# Patient Record
Sex: FEMALE | URBAN - METROPOLITAN AREA
[De-identification: names, ages, dates, MRNs, and addresses within clinical notes are randomized per-mention and may not be internally consistent; named-entity substitution may affect disease eponyms.]

---

## 2017-07-05 ENCOUNTER — HOSPITAL ENCOUNTER (EMERGENCY)
Facility: CLINIC | Age: 27
Discharge: HOME | End: 2017-07-05
Attending: EMERGENCY MEDICINE | Admitting: EMERGENCY MEDICINE
Payer: COMMERCIAL

## 2017-07-05 VITALS
HEART RATE: 82 BPM | DIASTOLIC BLOOD PRESSURE: 49 MMHG | WEIGHT: 143.13 LBS | RESPIRATION RATE: 18 BRPM | SYSTOLIC BLOOD PRESSURE: 96 MMHG | TEMPERATURE: 99.2 F | BODY MASS INDEX: 24.57 KG/M2 | OXYGEN SATURATION: 100 %

## 2017-07-05 DIAGNOSIS — R51.9 HEADACHE, UNSPECIFIED HEADACHE TYPE: ICD-10-CM

## 2017-07-05 LAB
HCG UR QL: NEGATIVE
INTERNAL QC OK POCT: YES

## 2017-07-05 PROCEDURE — 25000128 H RX IP 250 OP 636: Performed by: EMERGENCY MEDICINE

## 2017-07-05 PROCEDURE — 96374 THER/PROPH/DIAG INJ IV PUSH: CPT | Performed by: EMERGENCY MEDICINE

## 2017-07-05 PROCEDURE — 99284 EMERGENCY DEPT VISIT MOD MDM: CPT | Mod: 25 | Performed by: EMERGENCY MEDICINE

## 2017-07-05 PROCEDURE — 96361 HYDRATE IV INFUSION ADD-ON: CPT | Performed by: EMERGENCY MEDICINE

## 2017-07-05 PROCEDURE — 99284 EMERGENCY DEPT VISIT MOD MDM: CPT | Mod: Z6 | Performed by: EMERGENCY MEDICINE

## 2017-07-05 PROCEDURE — 81025 URINE PREGNANCY TEST: CPT | Performed by: EMERGENCY MEDICINE

## 2017-07-05 PROCEDURE — 96375 TX/PRO/DX INJ NEW DRUG ADDON: CPT | Performed by: EMERGENCY MEDICINE

## 2017-07-05 RX ORDER — DIPHENHYDRAMINE HYDROCHLORIDE 50 MG/ML
25 INJECTION INTRAMUSCULAR; INTRAVENOUS ONCE
Status: COMPLETED | OUTPATIENT
Start: 2017-07-05 | End: 2017-07-05

## 2017-07-05 RX ADMIN — SODIUM CHLORIDE 1000 ML: 9 INJECTION, SOLUTION INTRAVENOUS at 22:12

## 2017-07-05 RX ADMIN — DIPHENHYDRAMINE HYDROCHLORIDE 25 MG: 50 INJECTION, SOLUTION INTRAMUSCULAR; INTRAVENOUS at 22:14

## 2017-07-05 RX ADMIN — PROCHLORPERAZINE EDISYLATE 10 MG: 5 INJECTION INTRAMUSCULAR; INTRAVENOUS at 22:12

## 2017-07-05 ASSESSMENT — ENCOUNTER SYMPTOMS
LIGHT-HEADEDNESS: 0
NECK STIFFNESS: 0
FEVER: 0
NECK PAIN: 0
ABDOMINAL PAIN: 0
SHORTNESS OF BREATH: 0
NAUSEA: 1
HEADACHES: 1
BRUISES/BLEEDS EASILY: 0
ADENOPATHY: 0
VOMITING: 1
CHILLS: 0
COLOR CHANGE: 0
DIFFICULTY URINATING: 0
NUMBNESS: 0
AGITATION: 0
BACK PAIN: 0
POLYDIPSIA: 0

## 2017-07-05 NOTE — ED AVS SNAPSHOT
[facility], Emergency Department    [address]    [place] [address]    Phone:  [phone]    Fax:  [phone]                                       Leeanne Farooq   MRN: [id number]    Department:  [facility], Emergency Department   Date of Visit:  7/5/2017           Patient Information     Date Of Birth          1990        Your diagnoses for this visit were:     Headache, unspecified headache type        You were seen by Brayan Simons MD.        Discharge Instructions       Please make an appointment to follow up with Your Primary Care Provider in 1-2 days if you have any concerns.           *HEADACHE [unspecified]       The cause of your headache today is not clear, but it does not appear to be the sign of any serious illness.  Under stress, some people tense the muscles of their shoulder, neck and scalp without knowing it. If this condition lasts long enough, a TENSION HEADACHE can occur.  A MIGRAINE HEADACHE is caused by changes in blood flow to the brain. It can be mild or severe. A migraine attack may be triggered by emotional stress, hormone changes during the menstrual cycle, oral contraceptives, alcohol use, certain foods containing tyramine, eye strain, weather changes, missing meals, lack of sleep or oversleeping.  Other causes of headache include a viral illness, sinus, ear or throat infection, dental pain and TMJ (jaw joint) pain.  HOME CARE:  1. If you were given pain medicine for this headache, do not drive yourself home. Arrange for a ride, instead. When you get home, try to sleep. You should feel much better when you wake up.  2. If you are having nausea or vomiting, follow a light diet until your headache is relieved.  3. If you have a migraine type headache, use sunglasses when in the daylight or around bright indoor lighting until symptoms improve. Bright glaring light can worsen this kind of headache.  FOLLOW UP with your doctor if the headache is not better within  the next 24 hours. If you have frequent headaches you should discuss a treatment plan with your primary care doctor. By being aware of the earliest signs of headache, and starting treatment right away, you may be able to stop the pain yourself.  GET PROMPT MEDICAL ATTENTION if any of the following occur:    Worsening of your head pain or no improvement within 24 hours    Repeated vomiting (unable to keep liquids down)    Fever over 101 F (38.3 C)    Stiff neck    Extreme drowsiness, confusion or fainting    Weakness of an arm or leg or one side of the face    Difficulty with speech or vision    9000-2309 [employer], [address] [place] [address]. All rights reserved. This information is not intended as a substitute for professional medical care. Always follow your healthcare professional's instructions.          24 Hour Appointment Hotline       To make an appointment at any [facility], call [phone] ([phone]). If you don't have a family doctor or clinic, we will help you find one. [facility] clinics are conveniently located to serve the needs of you and your family.             Review of your medicines      Our records show that you are taking the medicines listed below. If these are incorrect, please call your family doctor or clinic.        Dose / Directions Last dose taken    DEPO-PROVERA 150 MG/ML injection   Dose:  150 mg   Generic drug:  medroxyPROGESTERone        Inject 150 mg into the muscle every 3 months.   Refills:  0                Procedures and tests performed during your visit     hCG qual urine POCT      Orders Needing Specimen Collection     None      Pending Results     No orders found from 7/3/2017 to 7/6/2017.            Pending Culture Results     No orders found from 7/3/2017 to 7/6/2017.            Pending Results Instructions     If you had any lab results that were not finalized at the time of your Discharge, you can call the ED Lab Result RN at  "[phone]. You will be contacted by this team for any positive Lab results or changes in treatment. The nurses are available 7 days a week from 10A to 6:30P.  You can leave a message 24 hours per day and they will return your call.        Thank you for choosing [facility]       Thank you for choosing [facility] for your care. Our goal is always to provide you with excellent care. Hearing back from our patients is one way we can continue to improve our services. Please take a few minutes to complete the written survey that you may receive in the mail after you visit with us. Thank you!        [employer] Information     "[facility] [facility] lets you send messages to your doctor, view your test results, renew your prescriptions, schedule appointments and more. To sign up, go to [email or web address]/"[email or web address] [email or web address] . Click on \"Log in\" on the left side of the screen, which will take you to the Welcome page. Then click on \"Sign up Now\" on the right side of the page.     You will be asked to enter the access code listed below, as well as some personal information. Please follow the directions to create your username and password.     Your access code is: [email or web address]  Expires: 10/3/2017 11:23 PM     Your access code will  in 90 days. If you need help or a new code, please call your [facility] clinic or [phone].        Care EveryWhere ID     This is your Care EveryWhere ID. This could be used by other organizations to access your [facility] medical records  [id number]        Equal Access to Services     LINDA GUAMAN : Hadshaneka gonzalez Soluanne, waaxda luqadaha, qaybta kaalmada brent, fannie shaikh . So [facility] [phone].    ATENCIÓN: Si habla español, tiene a rivero disposición servicios gratuitos de asistencia lingüística. Llame al [phone].    We comply with applicable federal civil rights laws and [place] laws. We do not discriminate on the basis of race, color, national origin, age, disability sex, " sexual orientation or gender identity.            After Visit Summary       This is your record. Keep this with you and show to your community pharmacist(s) and doctor(s) at your next visit.

## 2017-07-05 NOTE — ED AVS SNAPSHOT
[facility], [place], Emergency Department    [address]    [facility] [address]    Phone:  [phone]    Fax:  [phone]                                       Leeanne Farooq   MRN: [id number]    Department:  [facility], Emergency Department   Date of Visit:  7/5/2017           After Visit Summary Signature Page     I have received my discharge instructions, and my questions have been answered. I have discussed any challenges I see with this plan with the nurse or doctor.    ..........................................................................................................................................  Patient/Patient Representative Signature      ..........................................................................................................................................  Patient Representative Print Name and Relationship to Patient    ..................................................               ................................................  Date                                            Time    ..........................................................................................................................................  Reviewed by Signature/Title    ...................................................              ..............................................  Date                                                            Time

## 2017-07-06 NOTE — DISCHARGE INSTRUCTIONS
Please make an appointment to follow up with Your Primary Care Provider in 1-2 days if you have any concerns.           *HEADACHE [unspecified]       The cause of your headache today is not clear, but it does not appear to be the sign of any serious illness.  Under stress, some people tense the muscles of their shoulder, neck and scalp without knowing it. If this condition lasts long enough, a TENSION HEADACHE can occur.  A MIGRAINE HEADACHE is caused by changes in blood flow to the brain. It can be mild or severe. A migraine attack may be triggered by emotional stress, hormone changes during the menstrual cycle, oral contraceptives, alcohol use, certain foods containing tyramine, eye strain, weather changes, missing meals, lack of sleep or oversleeping.  Other causes of headache include a viral illness, sinus, ear or throat infection, dental pain and TMJ (jaw joint) pain.  HOME CARE:  1. If you were given pain medicine for this headache, do not drive yourself home. Arrange for a ride, instead. When you get home, try to sleep. You should feel much better when you wake up.  2. If you are having nausea or vomiting, follow a light diet until your headache is relieved.  3. If you have a migraine type headache, use sunglasses when in the daylight or around bright indoor lighting until symptoms improve. Bright glaring light can worsen this kind of headache.  FOLLOW UP with your doctor if the headache is not better within the next 24 hours. If you have frequent headaches you should discuss a treatment plan with your primary care doctor. By being aware of the earliest signs of headache, and starting treatment right away, you may be able to stop the pain yourself.  GET PROMPT MEDICAL ATTENTION if any of the following occur:    Worsening of your head pain or no improvement within 24 hours    Repeated vomiting (unable to keep liquids down)    Fever over 101 F (38.3 C)    Stiff neck    Extreme drowsiness, confusion or  fainting    Weakness of an arm or leg or one side of the face    Difficulty with speech or vision    7176-4732 [employer], [address] [place] [address], [place] [address]. All rights reserved. This information is not intended as a substitute for professional medical care. Always follow your healthcare professional's instructions.

## 2017-07-06 NOTE — ED NOTES
Pt has hx of migraines but hasn't had one for several years/Today at noon HA started with nausea and vomiting/HA continues with nausea

## 2017-07-06 NOTE — ED PROVIDER NOTES
History     Chief Complaint   Patient presents with     Headache     Per EMS PT c/o migraine all day today.     NELI Farooq is a 27 year old female with a history of recurrent headaches/migraines and bipolar 1 disorder who presents via EMS for evaluation of headache. The patient reports that today she developed a migraine that has persisted, prompting her arrival. Pain is mild to moderate, throbbing, nothing makes it better or worse. She has associated nausea and a couple episodes of vomiting today, as well as dizziness. Symptoms started after she ate [employer] earlier today. Fortunately, she states that presently she feels better compared to onset earlier. The patient reports a history of recurrent migraines but she states that these have been well-controlled recently, with last major episode of such occurring about 7 months ago. She states that she has had nausea and vomiting with her previous migraines but that this is generally atypical for her. No fevers. No neck pain. No visual disturbance. She has not previously seen a migraine specialist. The patient reports that she is otherwise healthy and on no regular medications.    No current facility-administered medications for this encounter.      Current Outpatient Prescriptions   Medication     medroxyPROGESTERone (DEPO-PROVERA) 150 MG/ML injection     Past Medical History:   Diagnosis Date     Migraine        Past Surgical History:   Procedure Laterality Date     ENT SURGERY  2012       No family history on file.    Social History   Substance Use Topics     Smoking status: Never Smoker     Smokeless tobacco: Never Used     Alcohol use No      No Known Allergies     I have reviewed the Medications, Allergies, Past Medical and Surgical History, and Social History in the Epic system.    Review of Systems   Constitutional: Negative for chills and fever.   HENT: Negative for congestion.    Eyes: Negative for visual disturbance.   Respiratory: Negative for  shortness of breath.    Cardiovascular: Negative for chest pain.   Gastrointestinal: Positive for nausea and vomiting. Negative for abdominal pain.   Endocrine: Negative for polydipsia and polyuria.   Genitourinary: Negative for difficulty urinating.   Musculoskeletal: Negative for back pain, neck pain and neck stiffness.   Skin: Negative for color change.   Neurological: Positive for headaches. Negative for light-headedness and numbness.   Hematological: Negative for adenopathy. Does not bruise/bleed easily.   Psychiatric/Behavioral: Negative for agitation and behavioral problems.       Physical Exam   BP: 133/80  Pulse: 66  Temp: 99.2  F (37.3  C)  Resp: 16  Weight: 64.9 kg (143 lb 2 oz)  SpO2: 98 %  Physical Exam   Constitutional: She is oriented to person, place, and time. She appears well-developed and well-nourished. No distress.   HENT:   Head: Normocephalic and atraumatic.   Mouth/Throat: Oropharynx is clear and moist. No oropharyngeal exudate.   Eyes: Conjunctivae and EOM are normal. Pupils are equal, round, and reactive to light. No scleral icterus.   Neck: Normal range of motion. Neck supple.   There is no meningismus.   Cardiovascular: Normal rate, normal heart sounds and intact distal pulses.    Pulmonary/Chest: Effort normal and breath sounds normal. No respiratory distress. She has no wheezes. She has no rales.   Abdominal: Soft. Bowel sounds are normal. There is no tenderness.   Musculoskeletal: Normal range of motion. She exhibits no edema or tenderness.   Lymphadenopathy:     She has no cervical adenopathy.   Neurological: She is alert and oriented to person, place, and time. She has normal strength and normal reflexes. She displays normal reflexes. No cranial nerve deficit or sensory deficit. She exhibits normal muscle tone. Coordination and gait normal. GCS eye subscore is 4. GCS verbal subscore is 5. GCS motor subscore is 6.   Reflex Scores:       Patellar reflexes are 2+ on the right side and  2+ on the left side.       Achilles reflexes are 2+ on the right side and 2+ on the left side.  No dysarthria, dysmetria, diplopia, disdiadochokinesia. Strength 5/5 in b/l UEs with  and b/l LEs with dorsi- and plantar-flexion against resistance. Sensation to light touch and 2-point discrimination intact in b/l distal UEs and LEs. CNs II-XII intact. Negative Romberg. Normal gait.   Skin: Skin is warm. No rash noted. She is not diaphoretic.   Psychiatric: She has a normal mood and affect. Her behavior is normal. Judgment and thought content normal.   Nursing note and vitals reviewed.      ED Course     ED Course     Procedures             Critical Care time:  none               Labs Ordered and Resulted from Time of ED Arrival Up to the Time of Departure from the ED   HCG QUAL URINE POCT - Normal            Assessments & Plan (with Medical Decision Making)   27-year-old complaining of a headache. Differential diagnosis: tension headache, migraine headache, unlikely cluster headache or stroke.    After a thorough history and physical exam the patient appears to be in no acute distress. I will treat her headache with a bolus of IV saline, IV Compazine, and IV Benadryl. She agrees with plan. No neurologic deficits on the examination.     11:15 PM The patient is reassessed. Headache has resolved. At this point she would like to be discharged. Gait is normal at discharge. She agrees to follow up with her primary-care physician for further evaluation and to return if her symptoms return.    This part of the document was transcribed by Wagner Esposito for Brayan Simons MD.    I have reviewed the nursing notes.    I have reviewed the findings, diagnosis, plan and need for follow up with the patient.    Discharge Medication List as of 7/5/2017 11:23 PM          Final diagnoses:   Headache, unspecified headache type     I, Wagner Esposito, am serving as a trained medical scribe to document services personally performed by Brayan  MD Ronak, based on the provider's statements to me.      I, Brayan Simons MD, was physically present and have reviewed and verified the accuracy of this note documented by Wagner Esposito.    7/5/2017   [facility], [facility], EMERGENCY DEPARTMENT     Brayan Simons MD  07/06/17 0104

## 2018-01-20 ENCOUNTER — APPOINTMENT (OUTPATIENT)
Dept: GENERAL RADIOLOGY | Facility: CLINIC | Age: 28
End: 2018-01-20
Attending: EMERGENCY MEDICINE
Payer: MEDICAID

## 2018-01-20 ENCOUNTER — HOSPITAL ENCOUNTER (EMERGENCY)
Facility: CLINIC | Age: 28
Discharge: HOME | End: 2018-01-20
Attending: EMERGENCY MEDICINE | Admitting: EMERGENCY MEDICINE
Payer: MEDICAID

## 2018-01-20 VITALS
WEIGHT: 135 LBS | SYSTOLIC BLOOD PRESSURE: 111 MMHG | OXYGEN SATURATION: 100 % | RESPIRATION RATE: 18 BRPM | BODY MASS INDEX: 23.17 KG/M2 | TEMPERATURE: 97.9 F | DIASTOLIC BLOOD PRESSURE: 69 MMHG

## 2018-01-20 DIAGNOSIS — Z33.1 PREGNANCY, INCIDENTAL: ICD-10-CM

## 2018-01-20 DIAGNOSIS — R55 SYNCOPE, UNSPECIFIED SYNCOPE TYPE: ICD-10-CM

## 2018-01-20 DIAGNOSIS — R06.00 DYSPNEA, UNSPECIFIED TYPE: ICD-10-CM

## 2018-01-20 LAB
ALBUMIN SERPL-MCNC: 3.3 G/DL (ref 3.4–5)
ALBUMIN UR-MCNC: 10 MG/DL
ALP SERPL-CCNC: 53 U/L (ref 40–150)
ALT SERPL W P-5'-P-CCNC: 11 U/L (ref 0–50)
ANION GAP SERPL CALCULATED.3IONS-SCNC: 9 MMOL/L (ref 3–14)
APPEARANCE UR: CLEAR
AST SERPL W P-5'-P-CCNC: 19 U/L (ref 0–45)
BASOPHILS # BLD AUTO: 0 10E9/L (ref 0–0.2)
BASOPHILS NFR BLD AUTO: 0.2 %
BILIRUB SERPL-MCNC: 0.2 MG/DL (ref 0.2–1.3)
BILIRUB UR QL STRIP: NEGATIVE
BUN SERPL-MCNC: 8 MG/DL (ref 7–30)
CALCIUM SERPL-MCNC: 8.9 MG/DL (ref 8.5–10.1)
CHLORIDE SERPL-SCNC: 103 MMOL/L (ref 94–109)
CO2 SERPL-SCNC: 22 MMOL/L (ref 20–32)
COLOR UR AUTO: YELLOW
CREAT SERPL-MCNC: 0.38 MG/DL (ref 0.52–1.04)
D DIMER PPP FEU-MCNC: 0.4 UG/ML FEU (ref 0–0.5)
DIFFERENTIAL METHOD BLD: ABNORMAL
EOSINOPHIL # BLD AUTO: 0.2 10E9/L (ref 0–0.7)
EOSINOPHIL NFR BLD AUTO: 2.7 %
ERYTHROCYTE [DISTWIDTH] IN BLOOD BY AUTOMATED COUNT: 12.2 % (ref 10–15)
GFR SERPL CREATININE-BSD FRML MDRD: >90 ML/MIN/1.7M2
GLUCOSE SERPL-MCNC: 95 MG/DL (ref 70–99)
GLUCOSE UR STRIP-MCNC: NEGATIVE MG/DL
HCG UR QL: POSITIVE
HCT VFR BLD AUTO: 31.2 % (ref 35–47)
HGB BLD-MCNC: 11 G/DL (ref 11.7–15.7)
HGB UR QL STRIP: NEGATIVE
IMM GRANULOCYTES # BLD: 0 10E9/L (ref 0–0.4)
IMM GRANULOCYTES NFR BLD: 0.3 %
INTERNAL QC OK POCT: YES
KETONES UR STRIP-MCNC: NEGATIVE MG/DL
LEUKOCYTE ESTERASE UR QL STRIP: NEGATIVE
LYMPHOCYTES # BLD AUTO: 1.7 10E9/L (ref 0.8–5.3)
LYMPHOCYTES NFR BLD AUTO: 27.9 %
MAGNESIUM SERPL-MCNC: 1.8 MG/DL (ref 1.6–2.3)
MCH RBC QN AUTO: 31.5 PG (ref 26.5–33)
MCHC RBC AUTO-ENTMCNC: 35.3 G/DL (ref 31.5–36.5)
MCV RBC AUTO: 89 FL (ref 78–100)
MONOCYTES # BLD AUTO: 0.7 10E9/L (ref 0–1.3)
MONOCYTES NFR BLD AUTO: 11.3 %
MUCOUS THREADS #/AREA URNS LPF: PRESENT /LPF
NEUTROPHILS # BLD AUTO: 3.4 10E9/L (ref 1.6–8.3)
NEUTROPHILS NFR BLD AUTO: 57.6 %
NITRATE UR QL: NEGATIVE
NRBC # BLD AUTO: 0 10*3/UL
NRBC BLD AUTO-RTO: 0 /100
NT-PROBNP SERPL-MCNC: 28 PG/ML (ref 0–450)
PH UR STRIP: 6 PH (ref 5–7)
PLATELET # BLD AUTO: 221 10E9/L (ref 150–450)
POTASSIUM SERPL-SCNC: 4 MMOL/L (ref 3.4–5.3)
PROT SERPL-MCNC: 7.4 G/DL (ref 6.8–8.8)
RBC # BLD AUTO: 3.49 10E12/L (ref 3.8–5.2)
RBC #/AREA URNS AUTO: <1 /HPF (ref 0–2)
SODIUM SERPL-SCNC: 134 MMOL/L (ref 133–144)
SOURCE: ABNORMAL
SP GR UR STRIP: 1.03 (ref 1–1.03)
SQUAMOUS #/AREA URNS AUTO: 3 /HPF (ref 0–1)
UROBILINOGEN UR STRIP-MCNC: 2 MG/DL (ref 0–2)
WBC # BLD AUTO: 6 10E9/L (ref 4–11)
WBC #/AREA URNS AUTO: 1 /HPF (ref 0–2)

## 2018-01-20 PROCEDURE — 83880 ASSAY OF NATRIURETIC PEPTIDE: CPT | Performed by: EMERGENCY MEDICINE

## 2018-01-20 PROCEDURE — 99284 EMERGENCY DEPT VISIT MOD MDM: CPT | Mod: 25 | Performed by: EMERGENCY MEDICINE

## 2018-01-20 PROCEDURE — 81001 URINALYSIS AUTO W/SCOPE: CPT | Performed by: EMERGENCY MEDICINE

## 2018-01-20 PROCEDURE — 99285 EMERGENCY DEPT VISIT HI MDM: CPT | Mod: 25 | Performed by: EMERGENCY MEDICINE

## 2018-01-20 PROCEDURE — 81025 URINE PREGNANCY TEST: CPT | Performed by: EMERGENCY MEDICINE

## 2018-01-20 PROCEDURE — 93005 ELECTROCARDIOGRAM TRACING: CPT | Performed by: EMERGENCY MEDICINE

## 2018-01-20 PROCEDURE — 85379 FIBRIN DEGRADATION QUANT: CPT | Performed by: EMERGENCY MEDICINE

## 2018-01-20 PROCEDURE — 80053 COMPREHEN METABOLIC PANEL: CPT | Performed by: EMERGENCY MEDICINE

## 2018-01-20 PROCEDURE — 71046 X-RAY EXAM CHEST 2 VIEWS: CPT

## 2018-01-20 PROCEDURE — 85025 COMPLETE CBC W/AUTO DIFF WBC: CPT | Performed by: EMERGENCY MEDICINE

## 2018-01-20 PROCEDURE — 83735 ASSAY OF MAGNESIUM: CPT | Performed by: EMERGENCY MEDICINE

## 2018-01-20 PROCEDURE — 93010 ELECTROCARDIOGRAM REPORT: CPT | Mod: Z6 | Performed by: EMERGENCY MEDICINE

## 2018-01-20 ASSESSMENT — ENCOUNTER SYMPTOMS
NAUSEA: 1
FREQUENCY: 1
VOMITING: 0
SHORTNESS OF BREATH: 1
FEVER: 0
DYSURIA: 1
COUGH: 1
PALPITATIONS: 0

## 2018-01-20 NOTE — DISCHARGE INSTRUCTIONS
Dizziness or Fainting During Pregnancy  Feeling dizzy or faint is very common during pregnancy. It generally does not mean something is wrong. It is most common during the first trimester, but it can happen anytime during pregnancy. Dizziness and fainting (syncope) are often caused by a drop in blood pressure. This is from the hormones released during pregnancy that relax the body s blood vessels. Too little blood is then pumped up to the brain. When this happens, you lose consciousness (faint). Fainting is not dangerous to you or your baby unless you fall and hurt yourself.     If you must stand for long periods, compression stockings can help keep your blood moving.   Home care  To help prevent dizziness and fainting:    When you get up from sitting or lying down, do so slowly. Clench and release your leg muscles before and during standing.    Avoid standing for too long. If you must stand for prolonged periods of time, wear compression stockings. These are special stockings that help keep blood flowing toward the heart. Most medical supply stores sell these stockings.    Avoid long breaks between meals. Keep snacks handy in case you get hungry.    Avoid hot showers or baths. Use warm water. Be careful to stand up from a bath slowly.    After the first trimester, avoid lying on your back.    Try to eat every few hours. Instead of eating 3 larger meals a day, try eating 6 smaller meals. Snack on healthy foods that contain some protein like nuts and peanut butter.    If you have been told you are anemic, eating iron-rich foods may be helpful.  If you feel dizzy:    Sit or lie down. If you sit, put your head between your knees. If you lie down, try to get your feet higher than your head.    Take deep breaths. Breathe out slowly.    Move toward fresh or circulating air.    Loosen tight clothing.    Do not eat or drink anything while you feel dizzy  Follow-up care  Follow up with your healthcare provider, or as  advised. Arrange for prenatal care if you haven t already. Go to all your prenatal appointments. Get prenatal tests as instructed.  When to seek medical advice  Call your healthcare provider right away if you have any of the following symptoms. These may mean a more severe cause for dizziness or fainting:    Vaginal bleeding    Pain in your abdomen    Less movement of the baby than usual    Unusually pale skin    Dizziness or fainting with blurred vision, headaches, confusion, palpitations, or fast heartbeat    You have fainted and hurt yourself or fallen hard on your abdomen  Date Last Reviewed: 6/1/2016 2000-2017 [employer]. [address] [place] [address], [place] [address]. All rights reserved. This information is not intended as a substitute for professional medical care. Always follow your healthcare professional's instructions.      Please make an appointment to follow up with your doctor next week. Return to the ER with new or worsening symptoms.

## 2018-01-20 NOTE — ED NOTES
Bed: ED19  Expected date:   Expected time:   Means of arrival:   Comments:  Aretha 416  28 yo F  SOB  ETA 5 min

## 2018-01-20 NOTE — ED AVS SNAPSHOT
[facility], [place], Emergency Department    [address]    [facility] [address]    Phone:  [phone]    Fax:  [phone]                                       Leeanne Farooq   MRN: [id number]    Department:  [facility], Emergency Department   Date of Visit:  1/20/2018           After Visit Summary Signature Page     I have received my discharge instructions, and my questions have been answered. I have discussed any challenges I see with this plan with the nurse or doctor.    ..........................................................................................................................................  Patient/Patient Representative Signature      ..........................................................................................................................................  Patient Representative Print Name and Relationship to Patient    ..................................................               ................................................  Date                                            Time    ..........................................................................................................................................  Reviewed by Signature/Title    ...................................................              ..............................................  Date                                                            Time

## 2018-01-20 NOTE — ED AVS SNAPSHOT
[facility], Emergency Department    [address]    [place] [address]    Phone:  [phone]    Fax:  [phone]                                       Leeanne Farooq   MRN: [id number]    Department:  [facility], Emergency Department   Date of Visit:  1/20/2018           Patient Information     Date Of Birth          1990        Your diagnoses for this visit were:     Syncope, unspecified syncope type     Dyspnea, unspecified type     Pregnancy, incidental        You were seen by Dasha Mcdaniel MD.        Discharge Instructions         Dizziness or Fainting During Pregnancy  Feeling dizzy or faint is very common during pregnancy. It generally does not mean something is wrong. It is most common during the first trimester, but it can happen anytime during pregnancy. Dizziness and fainting (syncope) are often caused by a drop in blood pressure. This is from the hormones released during pregnancy that relax the body s blood vessels. Too little blood is then pumped up to the brain. When this happens, you lose consciousness (faint). Fainting is not dangerous to you or your baby unless you fall and hurt yourself.     If you must stand for long periods, compression stockings can help keep your blood moving.   Home care  To help prevent dizziness and fainting:    When you get up from sitting or lying down, do so slowly. Clench and release your leg muscles before and during standing.    Avoid standing for too long. If you must stand for prolonged periods of time, wear compression stockings. These are special stockings that help keep blood flowing toward the heart. Most medical supply stores sell these stockings.    Avoid long breaks between meals. Keep snacks handy in case you get hungry.    Avoid hot showers or baths. Use warm water. Be careful to stand up from a bath slowly.    After the first trimester, avoid lying on your back.    Try to eat every few hours. Instead of eating 3 larger meals a  day, try eating 6 smaller meals. Snack on healthy foods that contain some protein like nuts and peanut butter.    If you have been told you are anemic, eating iron-rich foods may be helpful.  If you feel dizzy:    Sit or lie down. If you sit, put your head between your knees. If you lie down, try to get your feet higher than your head.    Take deep breaths. Breathe out slowly.    Move toward fresh or circulating air.    Loosen tight clothing.    Do not eat or drink anything while you feel dizzy  Follow-up care  Follow up with your healthcare provider, or as advised. Arrange for prenatal care if you haven t already. Go to all your prenatal appointments. Get prenatal tests as instructed.  When to seek medical advice  Call your healthcare provider right away if you have any of the following symptoms. These may mean a more severe cause for dizziness or fainting:    Vaginal bleeding    Pain in your abdomen    Less movement of the baby than usual    Unusually pale skin    Dizziness or fainting with blurred vision, headaches, confusion, palpitations, or fast heartbeat    You have fainted and hurt yourself or fallen hard on your abdomen  Date Last Reviewed: 6/1/2016 2000-2017 [employer]. [address] [place] [address]. All rights reserved. This information is not intended as a substitute for professional medical care. Always follow your healthcare professional's instructions.      Please make an appointment to follow up with your doctor next week. Return to the ER with new or worsening symptoms.       24 Hour Appointment Hotline       To make an appointment at any [facility], call [phone] ([phone]). If you don't have a family doctor or clinic, we will help you find one. [facility] are conveniently located to serve the needs of you and your family.             Review of your medicines      Our records show that you are taking the medicines listed below. If these are  "incorrect, please call your family doctor or clinic.        Dose / Directions Last dose taken    DEPO-PROVERA 150 MG/ML injection   Dose:  150 mg   Generic drug:  medroxyPROGESTERone        Inject 150 mg into the muscle every 3 months.   Refills:  0                Procedures and tests performed during your visit     Procedure/Test Number of Times Performed    CBC with platelets differential 1    Chest XR,  PA & LAT 1    Comprehensive metabolic panel 1    D dimer quantitative 1    EKG 12 lead 2    Magnesium 1    Nt probnp inpatient 1    UA with Microscopic 1    hCG qual urine POCT 1      Orders Needing Specimen Collection     None      Pending Results     No orders found from 1/18/2018 to 1/21/2018.            Pending Culture Results     No orders found from 1/18/2018 to 1/21/2018.            Pending Results Instructions     If you had any lab results that were not finalized at the time of your Discharge, you can call the ED Lab Result RN at [phone]. You will be contacted by this team for any positive Lab results or changes in treatment. The nurses are available 7 days a week from 10A to 6:30P.  You can leave a message 24 hours per day and they will return your call.        Thank you for choosing [facility]       Thank you for choosing [facility] for your care. Our goal is always to provide you with excellent care. Hearing back from our patients is one way we can continue to improve our services. Please take a few minutes to complete the written survey that you may receive in the mail after you visit with us. Thank you!        [facility] Information     [facility] lets you send messages to your doctor, view your test results, renew your prescriptions, schedule appointments and more. To sign up, go to [email or web address] . Click on \"Log in\" on the left side of the screen, which will take you to the Welcome page. Then click on \"Sign up Now\" on the right side of the page.     You will be asked to enter the access code " listed below, as well as some personal information. Please follow the directions to create your username and password.     Your access code is: [email or web address]  Expires: 2018  2:36 AM     Your access code will  in 90 days. If you need help or a new code, please call your [facility] clinic or [phone].        Care EveryWhere ID     This is your Care EveryWhere ID. This could be used by other organizations to access your [facility] medical records  [id number]        Equal Access to Services     [facility] Hospital[facility]NA[facility] : Zenia Obrien, claudia joyce, esvin fournieralander kennedy, fannie shaikh . So [facility] [phone].    ATENCIÓN: Si habla español, tiene a rivero disposición servicios gratuitos de asistencia lingüística. Llame al [phone].    We comply with applicable federal civil rights laws and [place] laws. We do not discriminate on the basis of race, color, national origin, age, disability, sex, sexual orientation, or gender identity.            After Visit Summary       This is your record. Keep this with you and show to your community pharmacist(s) and doctor(s) at your next visit.

## 2018-01-20 NOTE — ED NOTES
0525 Pt remains in waiting room, waiting for medical cab. This RN spoke with pt and medical cab dispatcher, medical cab should arrive within 1 hour. Pt was offered water, but had her own.

## 2018-01-20 NOTE — ED PROVIDER NOTES
"    [facility] EMERGENCY DEPARTMENT ([facility])    1/20/18       History     Chief Complaint   Patient presents with     Loss of Consciousness     Pt fainted after exiting shower 40 min PTA     Abdominal Pain     LUQ pain, midline lower \"burning\"     The history is provided by the patient.     Leeanne Farooq is a 27 year old female who presents to the Emergency Department today with a complaint of syncope.  She states she got out of a hot shower, and went to the kitchen to get herself something to eat.  She has been out of the shower for several minutes.  She states she recalled feeling some upper abdominal pain, which she gets from time to time, and then woke up on the floor.  She states that someone was home with her but did not tell her how long she was out for.  They called EMS.  She does not feel that she injured herself when she fell.  She had some nausea on and off tonight as well.  She also reports intermittently for the last few days at times feeling like she is, \"forgetting to breathe,\" or not getting enough air.  There is been no cough or fever.  She states that the shortness of breath comes and goes.  First she said she has had no cough and then she said perhaps a mild cough.  She is a non-smoker.  She denies history of DVT or PE, lower extremity pain or swelling, recent long travel or bedrest.  She initially did not tell me that she is pregnant then admitted that she is aware that she is pregnant, has sought prenatal care.  Her last menstrual period was November 6th.  She states she does not take prenatal vitamins because they make her feel sick.  This is her second pregnancy, her first pregnancy ended in a miscarriage.  She denies any fevers.  No palpitations or chest pain. Patient also states that she has perhaps some slight burning intermittently in her, \"uterus.\"  She states that that she is having some increased urinary frequency and occasionally feels some burning either before or after " "urinating.  No abnormal vaginal discharge or bleeding.     Interestingly, initially she told the nurse she was here for shortness of breath.  It was not until the nurse left the room and came back again that she told her that she \"might have\" passed out.  Her history seems somewhat variable.    This part of the document was transcribed by Magalys Tucker Medical Scribe.      I have reviewed the Medications, Allergies, Past Medical and Surgical History, and Social History in the [employer] system.  PAST MEDICAL HISTORY:   Past Medical History:   Diagnosis Date     Migraine        PAST SURGICAL HISTORY:   Past Surgical History:   Procedure Laterality Date     ENT SURGERY  2012       FAMILY HISTORY: No family history on file.    SOCIAL HISTORY:   Social History   Substance Use Topics     Smoking status: Never Smoker     Smokeless tobacco: Never Used     Alcohol use No       Discharge Medication List as of 1/20/2018  2:36 AM      CONTINUE these medications which have NOT CHANGED    Details   medroxyPROGESTERone (DEPO-PROVERA) 150 MG/ML injection Inject 150 mg into the muscle every 3 months., 150 mg, Intramuscular, EVERY 3 MONTHS, Until Discontinued, Historical                Allergies   Allergen Reactions     Cats      Dogs       Review of Systems   Constitutional: Negative for fever.   Respiratory: Positive for cough (mild) and shortness of breath.    Cardiovascular: Negative for chest pain, palpitations and leg swelling.   Gastrointestinal: Positive for nausea. Negative for vomiting.   Genitourinary: Positive for dysuria, frequency and pelvic pain (burning in her \"uterus\"). Negative for vaginal bleeding and vaginal discharge.   Neurological: Positive for syncope.   All other systems reviewed and are negative.      Physical Exam   BP: 124/79  Heart Rate: 82  Temp: 97.9  F (36.6  C)  Resp: 18  Weight: 61.2 kg (135 lb)  SpO2: 98 %      Physical Exam   Constitutional: She is oriented to person, place, and time. No distress. "   HENT:   Head: Atraumatic.   Mouth/Throat: Oropharynx is clear and moist. No oropharyngeal exudate.   Eyes: Pupils are equal, round, and reactive to light. No scleral icterus.   Cardiovascular: Normal heart sounds and intact distal pulses.    Pulmonary/Chest: Breath sounds normal. No respiratory distress.   Abdominal: Soft. There is no tenderness.   Musculoskeletal: She exhibits no edema or tenderness.   Neurological: She is alert and oriented to person, place, and time. No cranial nerve deficit. She exhibits normal muscle tone. Coordination normal.   Skin: Skin is warm. No rash noted. She is not diaphoretic.       ED Course     ED Course     Procedures             EKG Interpretation:      Interpreted by Dasha Mcdaniel  Time reviewed: 0045  Symptoms at time of EKG: none   Rhythm: normal sinus   Rate: normal  Axis: normal  Ectopy: none  Conduction: normal  ST Segments/ T Waves: No ST-T wave changes  Q Waves: none  Comparison to prior: No old EKG available    Clinical Impression: normal EKG            Critical Care time:  none           Labs Ordered and Resulted from Time of ED Arrival Up to the Time of Departure from the ED   CBC WITH PLATELETS DIFFERENTIAL - Abnormal; Notable for the following:        Result Value    RBC Count 3.49 (*)     Hemoglobin 11.0 (*)     Hematocrit 31.2 (*)     All other components within normal limits   COMPREHENSIVE METABOLIC PANEL - Abnormal; Notable for the following:     Creatinine 0.38 (*)     Albumin 3.3 (*)     All other components within normal limits   ROUTINE UA WITH MICROSCOPIC - Abnormal; Notable for the following:     Protein Albumin Urine 10 (*)     Squamous Epithelial /HPF Urine 3 (*)     Mucous Urine Present (*)     All other components within normal limits   HCG QUAL URINE POCT - Abnormal; Notable for the following:    MAGNESIUM   NT PROBNP INPATIENT   D DIMER QUANTITATIVE            Assessments & Plan (with Medical Decision Making)   The patient's history is  somewhat vague, does seem to change somewhat during her visit here.  She initially did not mention to the nurse that she passed out.  Regardless, she does not seem to be injured.  She is pregnant, which certainly is a likely contributing factor.  EKG looks normal, electrolytes not worrisome.  CBC unremarkable exception of a minimally depressed hemoglobin at 11.0.  UA negative for sign of infection.  She has no current abdominal pain.  I do not think she has an ectopic.  I did check a chest x-ray, given her reported intermittent shortness of breath, and this is unremarkable.  Certainly, I did consider PE, in light of the pregnancy, and intermittent shortness of breath, as well as reported syncopal event.  She has no lower external knee pain or swelling, no history of DVT or PE.  I did check a d-dimer, and it is negative at 0.4.  Given the intermittent nature of her symptoms, in conjunction with the normal d-dimer, my suspicion for PE is very low.  I do think she is safe for discharge home, and my suspicion for serious cause for symptoms tonight are low.  I do recommend follow-up with her OB doctor early next week, and return to the ER if new or worsening symptoms.  She verbalizes understanding, is agreeable to the plan.    I have reviewed the nursing notes.    I have reviewed the findings, diagnosis, plan and need for follow up with the patient.    Discharge Medication List as of 1/20/2018  2:36 AM          Final diagnoses:   Syncope, unspecified syncope type   Dyspnea, unspecified type   Pregnancy, incidental       1/20/2018   [facility], [place], EMERGENCY DEPARTMENT     Dasha Mcdaniel MD  01/20/18 [id number]

## 2018-01-22 LAB — INTERPRETATION ECG - MUSE: NORMAL

## 2020-03-03 ENCOUNTER — TELEPHONE (OUTPATIENT)
Dept: OBGYN | Facility: CLINIC | Age: 30
End: 2020-03-03

## 2020-03-03 DIAGNOSIS — O02.1 IUFD AT LESS THAN 20 WEEKS OF GESTATION: Primary | ICD-10-CM

## 2020-03-03 NOTE — TELEPHONE ENCOUNTER
Received referral from  Dr. Mckenzie for D&E, patient has IUFD measuring 15+4 by ultrasound today 3/3/20. Patient is on Medicaid, no need to check insurance for IUFD.     Routing to Dr. Henderson for D&E orders

## 2020-03-03 NOTE — TELEPHONE ENCOUNTER
Spoke with Gina, supervisor at [facility] where patient is currently residing. They state that patient has an appointment with [facility] tomorrow for an ultrasound and an appointment with provider.    Nurse told Gina to call us at [facility] if [facility] is unable to do D&E for patient. Supervisor agrees and will call [facility] if needed.     Ph. For [facility] is [phone]

## 2020-03-05 ENCOUNTER — TELEPHONE (OUTPATIENT)
Dept: OBGYN | Facility: CLINIC | Age: 30
End: 2020-03-05